# Patient Record
Sex: FEMALE | Race: WHITE | NOT HISPANIC OR LATINO | URBAN - NONMETROPOLITAN AREA
[De-identification: names, ages, dates, MRNs, and addresses within clinical notes are randomized per-mention and may not be internally consistent; named-entity substitution may affect disease eponyms.]

---

## 2020-10-08 ENCOUNTER — OFFICE VISIT (OUTPATIENT)
Dept: URGENT CARE | Facility: PHYSICIAN GROUP | Age: 35
End: 2020-10-08
Payer: OTHER GOVERNMENT

## 2020-10-08 ENCOUNTER — HOSPITAL ENCOUNTER (OUTPATIENT)
Facility: MEDICAL CENTER | Age: 35
End: 2020-10-08
Attending: PHYSICIAN ASSISTANT
Payer: OTHER GOVERNMENT

## 2020-10-08 VITALS
OXYGEN SATURATION: 100 % | RESPIRATION RATE: 16 BRPM | BODY MASS INDEX: 23.92 KG/M2 | DIASTOLIC BLOOD PRESSURE: 68 MMHG | SYSTOLIC BLOOD PRESSURE: 122 MMHG | HEART RATE: 76 BPM | HEIGHT: 63 IN | WEIGHT: 135 LBS | TEMPERATURE: 98.6 F

## 2020-10-08 DIAGNOSIS — Z20.822 CLOSE EXPOSURE TO COVID-19 VIRUS: ICD-10-CM

## 2020-10-08 DIAGNOSIS — R51.9 NONINTRACTABLE HEADACHE, UNSPECIFIED CHRONICITY PATTERN, UNSPECIFIED HEADACHE TYPE: ICD-10-CM

## 2020-10-08 LAB
COVID ORDER STATUS COVID19: NORMAL
SARS-COV-2 RNA RESP QL NAA+PROBE: DETECTED
SPECIMEN SOURCE: ABNORMAL

## 2020-10-08 PROCEDURE — U0003 INFECTIOUS AGENT DETECTION BY NUCLEIC ACID (DNA OR RNA); SEVERE ACUTE RESPIRATORY SYNDROME CORONAVIRUS 2 (SARS-COV-2) (CORONAVIRUS DISEASE [COVID-19]), AMPLIFIED PROBE TECHNIQUE, MAKING USE OF HIGH THROUGHPUT TECHNOLOGIES AS DESCRIBED BY CMS-2020-01-R: HCPCS

## 2020-10-08 PROCEDURE — 99214 OFFICE O/P EST MOD 30 MIN: CPT | Mod: CS | Performed by: PHYSICIAN ASSISTANT

## 2020-10-08 ASSESSMENT — ENCOUNTER SYMPTOMS
CONSTIPATION: 0
SHORTNESS OF BREATH: 0
DIZZINESS: 0
EYE PAIN: 0
MYALGIAS: 0
TREMORS: 0
HEADACHES: 1
COUGH: 0
FOCAL WEAKNESS: 0
TINGLING: 0
ABDOMINAL PAIN: 0
SORE THROAT: 0
FEVER: 0
NAUSEA: 0
CHILLS: 0
VOMITING: 0
DIARRHEA: 0

## 2020-10-08 NOTE — LETTER
October 8, 2020    To Whom It May Concern:         This is confirmation that Ludmila Olivarez attended her scheduled appointment with James Douglas P.A.-C. on 10/08/20.  Your employee was seen in our clinic today.  A concern for COVID-19 has been identified and testing is in progress.     We are asking you to excuse absences while following self-isolation protocol per Center for Disease Control (CDC) guidelines.  Your employee will be able to access test results through our electronic delivery system called [employer].     If the results of testing are negative, and once there has been no fever (temperature >100.4 F) for at least 72 hours without treatment, and no vomiting or diarrhea for at least 48 hours, then return to work is approved.    If the results of testing are positive then your employee will be contacted by the [facility] or [facility] for further instructions on duration of self-isolation and return to work protocol. In general, this will also follow the CDC guidelines with a minimum of 10 days from the onset of symptoms and without fever, vomiting, or diarrhea as above.     In general, repeat testing is not necessary and not offered through our [facility].     This is the only note that will be provided from [facility] for this visit.  Your employee will require an appointment with a primary care provider if FMLA or disability forms are required.         If you have any questions please do not hesitate to call me at the phone number listed below.    Sincerely,          James Douglas P.A.-C.  [phone]

## 2020-10-08 NOTE — PATIENT INSTRUCTIONS
Viral syndrome and Novel Coronavirus (COVID-19)       You have a viral syndrome which may include symptoms like muscle aches, fevers, chills, runny nose, cough, sneezing, sore throat, vomiting or diarrhea.  One of the potential viruses you may have is SARSCoV-2, the virus that causes COVID-19, also known as the novel coronavirus.  You may be just as likely to have a different viral infection such as the common cold or flu.  Most patients with COVID -19 have mild symptoms and recover on their own. Resting, staying hydrated, and sleeping are typically helpful.  As of today's visit, you are well enough to go home and treat your symptoms with oral fluids, medicines for fevers, cough, pain, etc.        COVID 19 testing is not performed on most people with mild symptoms who are being discharged from the Emergency Department or Clinic.      If COVID 19 testing was performed, the results will not be available for up to 4 days.  Please DO NOT CONTACT THE EMERGENCY DEPARTMENT OR CLINIC FOR RESULTS OF THIS TEST.       You will be contacted by a member of the [facility] team with your results and for further discussion.       Please follow the precautions below:      • Stay home except to get medical care.   • As advised by the Centers for Disease Control and Prevention (CDC), we recommend you stay in your home and minimize contact with others to avoid spreading this infection.   • The elderly or anyone with significant medical issues may have more severe symptoms from this infection. We recommend separation, also known as self-isolation, for at least 7 days after your first day of symptoms and several more after that if you are still sick. The most important action is wait for at least  a week and several more days after you feel well before returning to you regular activities, work or school. If you become sicker, like difficulty breathing, chest pain, you are unable to eat or drink enough, or have severe vomiting,  "diarrhea or weakness, you may need to return to the Emergency Department or contact your clinic provider for re-evaluation.    • You should restrict activities outside your home, except for getting medical care. Do not go to work, school, or public areas. Avoid using public transportation, ride-sharing, or taxis.   • Separate yourself from other people and animals in your home.   • As much as possible, you should stay in a specific room and away from other people in your home. Also, you should use a separate bathroom, if available.   • Avoid sharing personal household items. You should not share dishes, drinking glasses, cups, eating utensils, towels, or bedding with other people in your home. After using these items, they should be washed thoroughly with soap and water.         Precautions continued:    • Clean all \"high-touch\" surfaces every day high touch surfaces include counters, tabletops, doorknobs, bathroom fixtures, toilets, phones, keyboards, tablets, and bedside tables. Also, clean any surfaces that may have blood, stool, or body fluids on them. Use a household cleaning   spray or wipe, according to the label instructions. Labels contain instructions for safe and effective use of the cleaning product including precautions you should take when applying the product, such as wearing gloves and making sure you have good ventilation during use of the product.   • Clean your hands often. Wash your hands often with soap and water for at least 20 seconds. If soap and water are not available, clean your hands with an alcohol-based hand  that contains at least 60% alcohol, covering all surfaces of your hands and rubbing them together until they feel dry. Soap and water should be used preferentially if hands are visibly dirty. Avoid touching your eyes, nose, and mouth with unwashed hands.   • Cover your coughs and sneezes    • Cover your mouth and nose with a tissue when you cough or sneeze   • Throw used " tissues in a lined trash can; immediately wash your hands with soap and water for at least 20 seconds or clean your hands with an alcohol-based hand  that contains at least 60 to 95% alcohol, covering all surfaces of your hands and rubbing them together until they feel dry. Soap and water should be used preferentially if hands are visibly dirty.     • When seeking care at a healthcare facility:    · Seek prompt medical attention if your illness is worsening (e.g., difficulty breathing).    · Put on a facemask before you enter the facility.    · These steps will help the healthcare provider's office to keep other people in the office or waiting room from getting infected or exposed.    · If possible, put on a facemask before emergency medical services arrive.      Please see the resources below for more information.      CDC Corona Website https://www.cdc.gov/coronavirus/2019-ncov/index.html    General Information https://www.cdc.gov/coronavirus/2019-ncov/faq.html      [facility]: [phone]     [facility] Health Line [phone]

## 2020-10-08 NOTE — PROGRESS NOTES
Subjective:   Ludmila Olivarez is a 35 y.o. female who presents for Headache (work place exposure covid)      This is a 35-year-old female who presents to urgent care complaining of 1 week of mild global headache which is very typical for her around this time of year due to allergies and fluctuations in seasons.  She presents to urgent care as her boss who she is in close contact with tested positive for COVID and they are requesting that she get testing done.  She reports that she is always masked around her boss but they are in close quarters on a regular basis.  She denies other constitutional symptoms.  She takes ibuprofen intermittently for her headache.  She denies any double vision tinnitus or other neurologic signs, she has no neck stiffness      Review of Systems   Constitutional: Negative for chills and fever.   HENT: Negative for congestion, ear pain and sore throat.    Eyes: Negative for pain.   Respiratory: Negative for cough and shortness of breath.    Cardiovascular: Negative for chest pain.   Gastrointestinal: Negative for abdominal pain, constipation, diarrhea, nausea and vomiting.   Genitourinary: Negative for dysuria.   Musculoskeletal: Negative for myalgias.   Skin: Negative for rash.   Neurological: Positive for headaches. Negative for dizziness, tingling, tremors and focal weakness.       Medications:    • This patient does not have an active medication from one of the medication groupers.    Allergies: Amoxicillin    Problem List: Ludmila Olivarez does not have a problem list on file.    Surgical History:  Past Surgical History:   Procedure Laterality Date   • HEMORRHOIDECTOMY     • PRIMARY C SECTION         Past Social Hx: Ludmila Olivarez  reports that she has never smoked. She has never used smokeless tobacco.     Past Family Hx:  Ludmila Olivarez family history is not on file.     Problem list, medications, and allergies reviewed by myself today in Epic.     Objective:     /68 (BP Location:  "Right arm, Patient Position: Sitting, BP Cuff Size: Adult)   Pulse 76   Temp 37 °C (98.6 °F) (Temporal)   Resp 16   Ht 1.6 m (5' 3\")   Wt 61.2 kg (135 lb)   SpO2 100%   BMI 23.91 kg/m²     Physical Exam  Vitals signs reviewed.   Constitutional:       Appearance: Normal appearance.   HENT:      Head: Normocephalic and atraumatic.      Right Ear: External ear normal.      Left Ear: External ear normal.      Nose: Nose normal.      Mouth/Throat:      Mouth: Mucous membranes are moist.   Eyes:      Conjunctiva/sclera: Conjunctivae normal.   Cardiovascular:      Rate and Rhythm: Normal rate.   Pulmonary:      Effort: Pulmonary effort is normal.   Skin:     General: Skin is warm and dry.      Capillary Refill: Capillary refill takes less than 2 seconds.   Neurological:      General: No focal deficit present.      Mental Status: She is alert and oriented to person, place, and time.      Cranial Nerves: No cranial nerve deficit.      Coordination: Coordination normal.      Gait: Gait normal.      Deep Tendon Reflexes: Reflexes normal.         Assessment/Plan:     Diagnosis and associated orders:     1. Nonintractable headache, unspecified chronicity pattern, unspecified headache type  COVID/SARS COV-2 PCR   2. Close exposure to COVID-19 virus  COVID/SARS COV-2 PCR      Comments/MDM:     Patient's vital signs are reassuring and they appear hemodynamically stable and do not require higher level care at this time  I discussed self isolation and provided printed instructions (if applicable)  I discussed ER precautions and provided printed instructions (if applicable)  I discussed returning to clinic for mild symptoms or reevaluation  I educated the patient on possibility of a false-negative test and indications for repeat testing  I instructed the patient to try to follow up with their PCP (if applicable) for follow up care  I provided the patient the printed AVS which contains information about signing up for MyChart " (if applicable)  If the patient's results come back as NEGATIVE I will attempt to notify them via [employer], if the patient's test results are POSITIVE I WILL CALL THEM.    If requested, I provided the patient with a work note to provide to their employer or school regarding returning to work and discontinuation of self isolation.  All questions were answered and patient demonstrated verbal understanding of above.  I followed all reasonable PPE precautions during this encounter including but not limited to use of an N95 mask, gloves, and gown if indicated.    •            Differential diagnosis, natural history, supportive care, and indications for immediate follow-up discussed.    Advised the patient to follow-up with the primary care physician for recheck, reevaluation, and consideration of further management.    Please note that this dictation was created using voice recognition software. I have made a reasonable attempt to correct obvious errors, but I expect that there are errors of grammar and possibly content that I did not discover before finalizing the note.    This note was electronically signed by James Douglas PA-C

## 2020-10-09 ENCOUNTER — TELEPHONE (OUTPATIENT)
Dept: URGENT CARE | Facility: PHYSICIAN GROUP | Age: 35
End: 2020-10-09